# Patient Record
Sex: MALE | Race: WHITE | NOT HISPANIC OR LATINO | Employment: FULL TIME | URBAN - METROPOLITAN AREA
[De-identification: names, ages, dates, MRNs, and addresses within clinical notes are randomized per-mention and may not be internally consistent; named-entity substitution may affect disease eponyms.]

---

## 2018-06-18 ENCOUNTER — OFFICE VISIT (OUTPATIENT)
Dept: ORTHOPEDIC SURGERY | Facility: CLINIC | Age: 48
End: 2018-06-18

## 2018-06-18 ENCOUNTER — HOSPITAL ENCOUNTER (OUTPATIENT)
Dept: CARDIOLOGY | Facility: HOSPITAL | Age: 48
Discharge: HOME | End: 2018-06-18
Attending: ORTHOPAEDIC SURGERY | Admitting: ORTHOPAEDIC SURGERY

## 2018-06-18 VITALS — BODY MASS INDEX: 30.66 KG/M2 | TEMPERATURE: 97.7 F | WEIGHT: 207 LBS | HEIGHT: 69 IN

## 2018-06-18 DIAGNOSIS — M25.561 ACUTE PAIN OF RIGHT KNEE: Primary | ICD-10-CM

## 2018-06-18 DIAGNOSIS — S82.141A CLOSED FRACTURE OF RIGHT TIBIAL PLATEAU, INITIAL ENCOUNTER: ICD-10-CM

## 2018-06-18 DIAGNOSIS — M25.561 ACUTE PAIN OF RIGHT KNEE: ICD-10-CM

## 2018-06-18 LAB
BH CV LOWER VASCULAR LEFT COMMON FEMORAL AUGMENT: NORMAL
BH CV LOWER VASCULAR LEFT COMMON FEMORAL COMPETENT: NORMAL
BH CV LOWER VASCULAR LEFT COMMON FEMORAL COMPRESS: NORMAL
BH CV LOWER VASCULAR LEFT COMMON FEMORAL PHASIC: NORMAL
BH CV LOWER VASCULAR LEFT COMMON FEMORAL SPONT: NORMAL
BH CV LOWER VASCULAR RIGHT COMMON FEMORAL AUGMENT: NORMAL
BH CV LOWER VASCULAR RIGHT COMMON FEMORAL COMPETENT: NORMAL
BH CV LOWER VASCULAR RIGHT COMMON FEMORAL COMPRESS: NORMAL
BH CV LOWER VASCULAR RIGHT COMMON FEMORAL PHASIC: NORMAL
BH CV LOWER VASCULAR RIGHT COMMON FEMORAL SPONT: NORMAL
BH CV LOWER VASCULAR RIGHT DISTAL FEMORAL COMPRESS: NORMAL
BH CV LOWER VASCULAR RIGHT GASTRONEMIUS COMPRESS: NORMAL
BH CV LOWER VASCULAR RIGHT GREATER SAPH AK COMPRESS: NORMAL
BH CV LOWER VASCULAR RIGHT GREATER SAPH BK COMPRESS: NORMAL
BH CV LOWER VASCULAR RIGHT MID FEMORAL AUGMENT: NORMAL
BH CV LOWER VASCULAR RIGHT MID FEMORAL COMPETENT: NORMAL
BH CV LOWER VASCULAR RIGHT MID FEMORAL COMPRESS: NORMAL
BH CV LOWER VASCULAR RIGHT MID FEMORAL PHASIC: NORMAL
BH CV LOWER VASCULAR RIGHT MID FEMORAL SPONT: NORMAL
BH CV LOWER VASCULAR RIGHT PERONEAL COMPRESS: NORMAL
BH CV LOWER VASCULAR RIGHT POPLITEAL AUGMENT: NORMAL
BH CV LOWER VASCULAR RIGHT POPLITEAL COMPETENT: NORMAL
BH CV LOWER VASCULAR RIGHT POPLITEAL COMPRESS: NORMAL
BH CV LOWER VASCULAR RIGHT POPLITEAL PHASIC: NORMAL
BH CV LOWER VASCULAR RIGHT POPLITEAL SPONT: NORMAL
BH CV LOWER VASCULAR RIGHT POSTERIOR TIBIAL COMPRESS: NORMAL
BH CV LOWER VASCULAR RIGHT PROXIMAL FEMORAL COMPRESS: NORMAL
BH CV LOWER VASCULAR RIGHT SAPHENOFEMORAL JUNCTION AUGMENT: NORMAL
BH CV LOWER VASCULAR RIGHT SAPHENOFEMORAL JUNCTION COMPETENT: NORMAL
BH CV LOWER VASCULAR RIGHT SAPHENOFEMORAL JUNCTION COMPRESS: NORMAL
BH CV LOWER VASCULAR RIGHT SAPHENOFEMORAL JUNCTION PHASIC: NORMAL
BH CV LOWER VASCULAR RIGHT SAPHENOFEMORAL JUNCTION SPONT: NORMAL

## 2018-06-18 PROCEDURE — 73562 X-RAY EXAM OF KNEE 3: CPT | Performed by: ORTHOPAEDIC SURGERY

## 2018-06-18 PROCEDURE — 99203 OFFICE O/P NEW LOW 30 MIN: CPT | Performed by: ORTHOPAEDIC SURGERY

## 2018-06-18 PROCEDURE — 93971 EXTREMITY STUDY: CPT

## 2018-06-18 RX ORDER — OXYCODONE AND ACETAMINOPHEN 7.5; 325 MG/1; MG/1
TABLET ORAL
Qty: 50 TABLET | Refills: 0 | Status: SHIPPED | OUTPATIENT
Start: 2018-06-18 | End: 2018-06-22 | Stop reason: HOSPADM

## 2018-06-18 RX ORDER — OXYCODONE AND ACETAMINOPHEN 10; 325 MG/1; MG/1
TABLET ORAL
Refills: 0 | COMMUNITY
Start: 2018-06-11 | End: 2018-06-20

## 2018-06-19 ENCOUNTER — TELEPHONE (OUTPATIENT)
Dept: ORTHOPEDIC SURGERY | Facility: CLINIC | Age: 48
End: 2018-06-19

## 2018-06-19 NOTE — PROGRESS NOTES
New Knee      Patient: Clifford Cyr        YOB: 1970    Medical Record Number: [id number]        Chief Complaints: Right knee pain      History of Present Illness: This is a  48 y.o. [place] who injured his right knee approximately 10 days ago he was shoeing a horse and part of many was kicked on the anterior medial aspect of his right lower extremity.  He did finish shoeing a horse ultimately was seen for an x-ray placed on crutches.  He did see Dr. jerez felt however he states he never saw her he only saw her assistant and they were going to refer her somewhere but drop the ball he states his symptoms are moderate intermittent his significant swelling but that has improved he comes with a CT scan as well his past medical history is unremarkable    Allergies: No Known Allergies    Medications:   Home Medications:  No current outpatient prescriptions on file prior to visit.     No current facility-administered medications on file prior to visit.      Current Medications:  Scheduled Meds:  Continuous Infusions:  No current facility-administered medications for this visit.   PRN Meds:.    History reviewed. No pertinent past medical history.   History reviewed. No pertinent surgical history.     Social History     Occupational History   • Not on file.     Social History Main Topics   • Smoking status: Never Smoker   • Smokeless tobacco: Never Used   • Alcohol use Yes   • Drug use: No   • Sexual activity: Defer    Social History     Social History Narrative   • No narrative on file        Family History   Problem Relation Age of Onset   • No Known Problems Mother    • No Known Problems Father    • No Known Problems Sister    • Clotting disorder Brother    • No Known Problems Maternal Aunt    • No Known Problems Maternal Uncle    • No Known Problems Paternal Aunt    • No Known Problems Paternal Uncle    • No Known Problems Maternal Grandmother    • No Known Problems Maternal Grandfather    • No Known  "Problems Paternal Grandmother    • No Known Problems Paternal Grandfather    • Anesthesia problems Neg Hx    • Broken bones Neg Hx    • Cancer Neg Hx    • Collagen disease Neg Hx    • Diabetes Neg Hx    • Dislocations Neg Hx    • Osteoporosis Neg Hx    • Rheumatologic disease Neg Hx    • Scoliosis Neg Hx    • Severe sprains Neg Hx              Review of Systems: 14 point review of systems are remarkable for the knee pain only the remainder are negative    Review of Systems      Physical Exam: 48 y.o. male  General Appearance:    Alert, cooperative, in no acute distress                 Vitals:    06/18/18 1255   Temp: 97.7 °F (36.5 °C)   Weight: 93.9 kg (207 lb)   Height: 175.3 cm (69\")      Patient is alert and read ×3 no acute distress appears her above-listed at height weight and age.  Affect is normal respiratory rate is normal unlabored. Heart rate regular rate rhythm, sclera, dentition and hearing are normal for the purpose of this exam.        Ortho Exam physical exam the right knee has significant knee effusion calf swelling ankle and foot swelling is perhaps tenderness around the entire proximal tibia he has 1+ distal pulses    Procedures             Radiology:   AP, Lateral and merchant views of the right knee  were ordered/reviewed to evauateknee pain.  He has a comminuted bicondylar tibial plateau fracture he also comes with a CT scan with 3-D reconstructions which show a comminuted, intra-articular bicondylar tibial plateau fracture that is displaced  Imaging Results (most recent)     Procedure Component Value Units Date/Time    XR Knee 3 View Right [[id number]] Resulted:  06/18/18 1246     Updated:  06/18/18 1247    Impression:       Ordering physician's impression is located in the Encounter Note dated 06/18/18. X-ray performed in the DR room.          Assessment/Plan:    Tibial plateau fracture this needs to be fixed.  I had a long discussion with him regarding the severity of this injury and the " outlook for future arthritis.  I do think he needs a Doppler to rule out a DVT in view the fact he has significant swelling in his had a recent long car ride we will get a Doppler today I will refill his pain medicine and I will give him to Dr. Capellan  whom I spoke with

## 2018-06-20 ENCOUNTER — HOSPITAL ENCOUNTER (OUTPATIENT)
Dept: GENERAL RADIOLOGY | Facility: HOSPITAL | Age: 48
Discharge: HOME | End: 2018-06-20
Admitting: ORTHOPAEDIC SURGERY

## 2018-06-20 ENCOUNTER — APPOINTMENT (OUTPATIENT)
Dept: PREADMISSION TESTING | Facility: HOSPITAL | Age: 48
End: 2018-06-20

## 2018-06-20 VITALS
HEIGHT: 69 IN | HEART RATE: 67 BPM | DIASTOLIC BLOOD PRESSURE: 83 MMHG | WEIGHT: 210.56 LBS | OXYGEN SATURATION: 97 % | SYSTOLIC BLOOD PRESSURE: 149 MMHG | TEMPERATURE: 97.9 F | BODY MASS INDEX: 31.19 KG/M2 | RESPIRATION RATE: 16 BRPM

## 2018-06-20 PROBLEM — S82.141A CLOSED BICONDYLAR FRACTURE OF RIGHT TIBIAL PLATEAU: Status: ACTIVE | Noted: 2018-06-20

## 2018-06-20 LAB
ALBUMIN SERPL-MCNC: 4.4 G/DL (ref 3.5–5.2)
ALBUMIN/GLOB SERPL: 1.5 G/DL
ALP SERPL-CCNC: 53 U/L (ref 39–117)
ALT SERPL W P-5'-P-CCNC: 27 U/L (ref 1–41)
ANION GAP SERPL CALCULATED.3IONS-SCNC: 14.3 MMOL/L
AST SERPL-CCNC: 17 U/L (ref 1–40)
BASOPHILS # BLD AUTO: 0.05 10*3/MM3 (ref 0–0.2)
BASOPHILS NFR BLD AUTO: 0.6 % (ref 0–1.5)
BILIRUB SERPL-MCNC: 0.7 MG/DL (ref 0.1–1.2)
BUN BLD-MCNC: 24 MG/DL (ref 6–20)
BUN/CREAT SERPL: 23.8 (ref 7–25)
CALCIUM SPEC-SCNC: 9.2 MG/DL (ref 8.6–10.5)
CHLORIDE SERPL-SCNC: 103 MMOL/L (ref 98–107)
CO2 SERPL-SCNC: 24.7 MMOL/L (ref 22–29)
CREAT BLD-MCNC: 1.01 MG/DL (ref 0.76–1.27)
DEPRECATED RDW RBC AUTO: 41.6 FL (ref 37–54)
EOSINOPHIL # BLD AUTO: 0.43 10*3/MM3 (ref 0–0.7)
EOSINOPHIL NFR BLD AUTO: 5.3 % (ref 0.3–6.2)
ERYTHROCYTE [DISTWIDTH] IN BLOOD BY AUTOMATED COUNT: 12.6 % (ref 11.5–14.5)
GFR SERPL CREATININE-BSD FRML MDRD: 79 ML/MIN/1.73
GLOBULIN UR ELPH-MCNC: 2.9 GM/DL
GLUCOSE BLD-MCNC: 135 MG/DL (ref 65–99)
HCT VFR BLD AUTO: 43.4 % (ref 40.4–52.2)
HGB BLD-MCNC: 14.2 G/DL (ref 13.7–17.6)
IMM GRANULOCYTES # BLD: 0 10*3/MM3 (ref 0–0.03)
IMM GRANULOCYTES NFR BLD: 0 % (ref 0–0.5)
LYMPHOCYTES # BLD AUTO: 2.42 10*3/MM3 (ref 0.9–4.8)
LYMPHOCYTES NFR BLD AUTO: 30 % (ref 19.6–45.3)
MCH RBC QN AUTO: 29.6 PG (ref 27–32.7)
MCHC RBC AUTO-ENTMCNC: 32.7 G/DL (ref 32.6–36.4)
MCV RBC AUTO: 90.6 FL (ref 79.8–96.2)
MONOCYTES # BLD AUTO: 0.9 10*3/MM3 (ref 0.2–1.2)
MONOCYTES NFR BLD AUTO: 11.2 % (ref 5–12)
NEUTROPHILS # BLD AUTO: 4.26 10*3/MM3 (ref 1.9–8.1)
NEUTROPHILS NFR BLD AUTO: 52.9 % (ref 42.7–76)
PLATELET # BLD AUTO: 254 10*3/MM3 (ref 140–500)
PMV BLD AUTO: 10.9 FL (ref 6–12)
POTASSIUM BLD-SCNC: 4.4 MMOL/L (ref 3.5–5.2)
PROT SERPL-MCNC: 7.3 G/DL (ref 6–8.5)
RBC # BLD AUTO: 4.79 10*6/MM3 (ref 4.6–6)
SODIUM BLD-SCNC: 142 MMOL/L (ref 136–145)
WBC NRBC COR # BLD: 8.06 10*3/MM3 (ref 4.5–10.7)

## 2018-06-20 PROCEDURE — 93005 ELECTROCARDIOGRAM TRACING: CPT

## 2018-06-20 PROCEDURE — 85025 COMPLETE CBC W/AUTO DIFF WBC: CPT | Performed by: ORTHOPAEDIC SURGERY

## 2018-06-20 PROCEDURE — 93010 ELECTROCARDIOGRAM REPORT: CPT | Performed by: INTERNAL MEDICINE

## 2018-06-20 PROCEDURE — 80053 COMPREHEN METABOLIC PANEL: CPT | Performed by: ORTHOPAEDIC SURGERY

## 2018-06-20 PROCEDURE — 71046 X-RAY EXAM CHEST 2 VIEWS: CPT

## 2018-06-20 PROCEDURE — 36415 COLL VENOUS BLD VENIPUNCTURE: CPT

## 2018-06-20 NOTE — H&P
Provider: JESSY ARAIZA MD  HUANG JOE is a 48 year old male whose main reason for today's visit is pain in the right knee which has been present for 12 days.  The injury occurred on 06/08/2018.  He was visiting his friend in [place], [place].  Unfortunately, a horse kicked him on the knee.  He had hyperextension of the knee.  He felt sudden onset of pain.  The patient initially saw Dr. Kirstie Vickers when he returned to [place].  He was ordered to have a CT scan.  Unfortunately, he was not able to get any further answers and he has seen Dr. Cecile Granados.  Dr. Granados has contacted me with his injury films and asked me to evaluate and manage him further.  The patient is on pain meds(PERCOCET TABS (OXYCODONE-ACETAMINOPHEN TABS)).  The patient states that he is having a sharp pain which he rates at a 8 on a scale from 1-10.  The pain occurs intermittently.    He is currently nonweightbearing and is on crutches.  He had an ultrasound evaluation which was negative for blood clots.  Denies any history of MRSA, DVT.  He works as a horse farrier.  Review of Systems:  Positive for: Increased Thirst.    Patient denies: Abdominal Pain, Bleeding, Chest Pain, Convulsions/Seizure, Decreased Motion, Depression, Difficulty Swallowing, Easy Bruisability, Emotional Disturbances, Eyes or Vision Problems, Fecal Incontinence, Fever/Chills, Headaches, Increased Hunger, Insomnia, Joint Pain, Nausea/Vomiting, Night Sweats, Poor Balance, Persistent Cough, Rash, Shortness of Breath, Shortness of Breath While Lying down, Skin Problems, Urinary Retention and Weakness.  Allergies:  * no known allergies  Medications:  advair diskus 250-50 mcg/dose inhalation aerosol powder breath activated (fluticasone-salmeterol)   ibuprofen caps (ibuprofen caps)   percocet tabs (oxycodone-acetaminophen tabs)   aleve tabs (naproxen sodium tabs)   Patient History of:  BLOOD CLOTS/EMBOLISM - NEGATIVE  Surgical History:  Negative  Known Family  History of:  cancer-father  cancer-mother  Social History:  TATYANA KHALIL is a  48 year old male.  He has never used tobacco products.      Past medical, social, family histories and ROS reviewed today with the patient and changes documented in the chart (06/20/2018).  PCP MARILEE George DO    Physical Exam  Height:  66 in.    Weight:  211.2 lbs.     BMI:  34.21  Pulse:  67  Blood pressure:  150 / 100 mm Hg    Gait: antalgic               Ambulation: with crutches      Mental/HEENT/Cardio/Skin  The patient's general appearance is well developed, well nourished, no acute distress.  Orientation is alert and oriented x 3.  The patient's mood is normal.  A head exam reveals normocephalic/atraumatic.  An eye exam reveals pupils equal.  Pulmonary exam shows normal air exchange, no labored breathing, or shortness of breath.  A skin exam shows normal temperature and color in the area of examination.      Right Knee  limited examination secondary to his recent injury. Skin is normal.  Effusion is 2+.  No warmth.  Patient shows no signs of DVT.  Normal sensation.  The DP Pulse is 2+.  Capillary refill is normal.  Range of motion of the knee is 10 to 45 degrees of flexion.  There is severe tenderness in the medial tibial plateau.      Left Knee        Imaging/Diagnostic Studies    X-rays of the right knee were taken on 06/18/2018 were reviewed.  X-rays show fracture of the tibial plateau .  bicondylar fracture  Impression  Right tibial plateau displaced fracture ([id number])    Plan  The patient has  a displaced bicondylar tibial plateau fracture .    Options were discussed including nonoperative management versus surgical intervention.    The patient is indicated for open reduction and internal fixation of right tibial plateau fracture, medial and lateral.    The likely risks and benefits of the procedure including but not limited to infection, DVT, pulmonary embolism, malunion, nonunion, post traumatic  stiffness, post traumatic arthritis, possibility of injury to nerves, vessels, tendons have been discussed in detail with the patient and his wife.  Despite the risks involved the patient would like to proceed.    The surgery is scheduled for open reduction internal fixation of the right tibial plateau fracture   at [facility] on June 21, 2018.  He may need a bone void filler/allograft.  He understands that he has to remain nonweightbearing on the right lower extremity for up to 3 months.  And there is a significant amount of rehabilitation involved.  Patient does not use tobacco.      We discussed the benefits of surgical intervention, as well as alternative treatments.  Potential surgical risks and complications include but are not limited to expected outcomes and the risk that the procedure may not accomplish the desired objective.  Sufficient opportunity was given to discuss the condition and treatment plan with the doctor, and all questions were answered for the patient.  The discussion lasted 30 minutes.      Clifford should follow up with JESSY ARAIZA MD post op.

## 2018-06-20 NOTE — DISCHARGE INSTRUCTIONS
Take the following medications the morning of surgery with a small sip of water:    HYDROCODONE IF NEEDED    General Instructions:  • Do not eat solid food after midnight the night before surgery.  • You may drink clear liquids day of surgery but must stop at least one hour before your hospital arrival time @ 0900 AM STOP DRINKING!!!  • It is beneficial for you to have a clear drink that contains carbohydrates the day of surgery.  We suggest a 12 to 20 ounce bottle of Gatorade or Powerade for non-diabetic patients or a 12 to 20 ounce bottle of G2 or Powerade Zero for diabetic patients. (Pediatric patients, are not advised to drink a 12 to 20 ounce carbohydrate drink)    Clear liquids are liquids you can see through.  Nothing red in color.     Plain water                               Sports drinks  Sodas                                   Gelatin (Jell-O)  Fruit juices without pulp such as white grape juice and apple juice  Popsicles that contain no fruit or yogurt  Tea or coffee (no cream or milk added)  Gatorade / Powerade  G2 / Powerade Zero  • Patients who avoid smoking, chewing tobacco and alcohol for 4 weeks prior to surgery have a reduced risk of post-operative complications.  Quit smoking as many days before surgery as you can.  • Do not smoke, use chewing tobacco or drink alcohol the day of surgery.   • If applicable bring your C-PAP/ BI-PAP machine.  • Bring any papers given to you in the doctor’s office.  • Wear clean comfortable clothes and socks.  • Do not wear contact lenses or make-up.  Bring a case for your glasses.   • Bring crutches or walker if applicable.  • Remove all piercings.  Leave jewelry and any other valuables at home.  • Hair extensions with metal clips must be removed prior to surgery.  • The Pre-Admission Testing nurse will instruct you to bring medications if unable to obtain an accurate list in Pre-Admission Testing.          Preventing a Surgical Site Infection:  • For 2 to 3 days  before surgery, avoid shaving with a razor because the razor can irritate skin and make it easier to develop an infection.    • Any areas of open skin can increase the risk of a post-operative wound infection by allowing bacteria to enter and travel throughout the body.  Notify your surgeon if you have any skin wounds / rashes even if it is not near the expected surgical site.  The area will need assessed to determine if surgery should be delayed until it is healed.  • The night prior to surgery sleep in a clean bed with clean clothing.  Do not allow pets to sleep with you.  • Shower on the morning of surgery using a fresh bar of anti-bacterial soap (such as Dial) and clean washcloth.  Dry with a clean towel and dress in clean clothing.  • Ask your surgeon if you will be receiving antibiotics prior to surgery.  • Make sure you, your family, and all healthcare providers clean their hands with soap and water or an alcohol based hand  before caring for you or your wound.    Day of surgery:  06- ARRIVE @ 1000 AM REPORT TO THE [facility]  Upon arrival, a Pre-op nurse and Anesthesiologist will review your health history, obtain vital signs, and answer questions you may have.  The only belongings needed at this time will be your home medications and if applicable your C-PAP/BI-PAP machine.  If you are staying overnight your family can leave the rest of your belongings in the car and bring them to your room later.  A Pre-op nurse will start an IV and you may receive medication in preparation for surgery, including something to help you relax.  Your family will be able to see you in the Pre-op area.  While you are in surgery your family should notify the waiting room  if they leave the waiting room area and provide a contact phone number.    Please be aware that surgery does come with discomfort.  We want to make every effort to control your discomfort so please discuss any uncontrolled symptoms with  your nurse.   Your doctor will most likely have prescribed pain medications.      If you are going home after surgery you will receive individualized written care instructions before being discharged.  A responsible adult must drive you to and from the hospital on the day of your surgery and stay with you for 24 hours.    If you are staying overnight following surgery, you will be transported to your hospital room following the recovery period.  [facility] has all private rooms.    If you have any questions please call Pre-Admission Testing at [phone].  Deductibles and co-payments are collected on the day of service. Please be prepared to pay the required co-pay, deductible or deposit on the day of service as defined by your plan.

## 2018-06-21 ENCOUNTER — APPOINTMENT (OUTPATIENT)
Dept: GENERAL RADIOLOGY | Facility: HOSPITAL | Age: 48
End: 2018-06-21

## 2018-06-21 ENCOUNTER — ANESTHESIA EVENT (OUTPATIENT)
Dept: PERIOP | Facility: HOSPITAL | Age: 48
End: 2018-06-21

## 2018-06-21 ENCOUNTER — HOSPITAL ENCOUNTER (INPATIENT)
Facility: HOSPITAL | Age: 48
LOS: 1 days | Discharge: HOME | End: 2018-06-22
Attending: ORTHOPAEDIC SURGERY | Admitting: ORTHOPAEDIC SURGERY

## 2018-06-21 ENCOUNTER — ANESTHESIA (OUTPATIENT)
Dept: PERIOP | Facility: HOSPITAL | Age: 48
End: 2018-06-21

## 2018-06-21 DIAGNOSIS — S82.141A CLOSED BICONDYLAR FRACTURE OF RIGHT TIBIAL PLATEAU: Primary | ICD-10-CM

## 2018-06-21 PROCEDURE — 25010000002 MIDAZOLAM PER 1 MG: Performed by: ANESTHESIOLOGY

## 2018-06-21 PROCEDURE — 25010000003 CEFAZOLIN IN DEXTROSE 2-4 GM/100ML-% SOLUTION: Performed by: ORTHOPAEDIC SURGERY

## 2018-06-21 PROCEDURE — C1713 ANCHOR/SCREW BN/BN,TIS/BN: HCPCS | Performed by: ORTHOPAEDIC SURGERY

## 2018-06-21 PROCEDURE — 25010000002 PROPOFOL 10 MG/ML EMULSION: Performed by: NURSE ANESTHETIST, CERTIFIED REGISTERED

## 2018-06-21 PROCEDURE — G0378 HOSPITAL OBSERVATION PER HR: HCPCS

## 2018-06-21 PROCEDURE — 25010000002 KETOROLAC TROMETHAMINE PER 15 MG: Performed by: ORTHOPAEDIC SURGERY

## 2018-06-21 PROCEDURE — 25010000002 SUCCINYLCHOLINE PER 20 MG: Performed by: NURSE ANESTHETIST, CERTIFIED REGISTERED

## 2018-06-21 PROCEDURE — 25010000002 FENTANYL CITRATE (PF) 100 MCG/2ML SOLUTION: Performed by: NURSE ANESTHETIST, CERTIFIED REGISTERED

## 2018-06-21 PROCEDURE — 25010000002 ROPIVACAINE PER 1 MG: Performed by: ANESTHESIOLOGY

## 2018-06-21 PROCEDURE — 25010000002 DEXAMETHASONE PER 1 MG: Performed by: NURSE ANESTHETIST, CERTIFIED REGISTERED

## 2018-06-21 PROCEDURE — L1830 KO IMMOB CANVAS LONG PRE OTS: HCPCS | Performed by: ORTHOPAEDIC SURGERY

## 2018-06-21 PROCEDURE — 25010000002 KETOROLAC TROMETHAMINE PER 15 MG: Performed by: NURSE ANESTHETIST, CERTIFIED REGISTERED

## 2018-06-21 PROCEDURE — 73560 X-RAY EXAM OF KNEE 1 OR 2: CPT

## 2018-06-21 PROCEDURE — 73590 X-RAY EXAM OF LOWER LEG: CPT

## 2018-06-21 PROCEDURE — 76000 FLUOROSCOPY <1 HR PHYS/QHP: CPT

## 2018-06-21 PROCEDURE — 0QSG04Z REPOSITION RIGHT TIBIA WITH INTERNAL FIXATION DEVICE, OPEN APPROACH: ICD-10-PCS | Performed by: ORTHOPAEDIC SURGERY

## 2018-06-21 PROCEDURE — 25010000002 FENTANYL CITRATE (PF) 100 MCG/2ML SOLUTION: Performed by: ANESTHESIOLOGY

## 2018-06-21 PROCEDURE — 25010000002 ONDANSETRON PER 1 MG: Performed by: NURSE ANESTHETIST, CERTIFIED REGISTERED

## 2018-06-21 DEVICE — SCRW LK S/TAP STRDRV 3.5X48MM: Type: IMPLANTABLE DEVICE | Site: TIBIA | Status: FUNCTIONAL

## 2018-06-21 DEVICE — IMPLANTABLE DEVICE: Type: IMPLANTABLE DEVICE | Site: TIBIA | Status: FUNCTIONAL

## 2018-06-21 DEVICE — GII QUICKANCHOR PLUS SIZE 2 (5 METRIC) PANACRYL BRAIDED ABSORBABLE SUTURE 36 INCHES (91CM), DOUBLE ARMED WITH CP-2 NEEDLES, WITH DISPOSABLE INSERTER.
Type: IMPLANTABLE DEVICE | Site: TIBIA | Status: FUNCTIONAL
Brand: GII QUICKANCHOR PANACRYL

## 2018-06-21 DEVICE — SCRW CORT S/TAP 3.5X55MM: Type: IMPLANTABLE DEVICE | Site: TIBIA | Status: FUNCTIONAL

## 2018-06-21 DEVICE — SCRW LK S/TAP STRDRV 3.5X60MM: Type: IMPLANTABLE DEVICE | Site: TIBIA | Status: FUNCTIONAL

## 2018-06-21 RX ORDER — ONDANSETRON 2 MG/ML
4 INJECTION INTRAMUSCULAR; INTRAVENOUS EVERY 6 HOURS PRN
Status: DISCONTINUED | OUTPATIENT
Start: 2018-06-21 | End: 2018-06-22 | Stop reason: HOSPADM

## 2018-06-21 RX ORDER — FENTANYL CITRATE 50 UG/ML
50 INJECTION, SOLUTION INTRAMUSCULAR; INTRAVENOUS
Status: DISCONTINUED | OUTPATIENT
Start: 2018-06-21 | End: 2018-06-21 | Stop reason: HOSPADM

## 2018-06-21 RX ORDER — LABETALOL HYDROCHLORIDE 5 MG/ML
5 INJECTION, SOLUTION INTRAVENOUS
Status: DISCONTINUED | OUTPATIENT
Start: 2018-06-21 | End: 2018-06-21 | Stop reason: HOSPADM

## 2018-06-21 RX ORDER — LIDOCAINE HYDROCHLORIDE 20 MG/ML
INJECTION, SOLUTION INFILTRATION; PERINEURAL AS NEEDED
Status: DISCONTINUED | OUTPATIENT
Start: 2018-06-21 | End: 2018-06-21 | Stop reason: SURG

## 2018-06-21 RX ORDER — PROMETHAZINE HYDROCHLORIDE 25 MG/1
25 SUPPOSITORY RECTAL ONCE AS NEEDED
Status: DISCONTINUED | OUTPATIENT
Start: 2018-06-21 | End: 2018-06-21 | Stop reason: HOSPADM

## 2018-06-21 RX ORDER — SUCCINYLCHOLINE CHLORIDE 20 MG/ML
INJECTION INTRAMUSCULAR; INTRAVENOUS AS NEEDED
Status: DISCONTINUED | OUTPATIENT
Start: 2018-06-21 | End: 2018-06-21 | Stop reason: SURG

## 2018-06-21 RX ORDER — MIDAZOLAM HYDROCHLORIDE 1 MG/ML
1 INJECTION INTRAMUSCULAR; INTRAVENOUS
Status: DISCONTINUED | OUTPATIENT
Start: 2018-06-21 | End: 2018-06-21 | Stop reason: HOSPADM

## 2018-06-21 RX ORDER — KETOROLAC TROMETHAMINE 30 MG/ML
30 INJECTION, SOLUTION INTRAMUSCULAR; INTRAVENOUS EVERY 8 HOURS
Status: COMPLETED | OUTPATIENT
Start: 2018-06-21 | End: 2018-06-22

## 2018-06-21 RX ORDER — MEPERIDINE HYDROCHLORIDE 25 MG/ML
12.5 INJECTION INTRAMUSCULAR; INTRAVENOUS; SUBCUTANEOUS
Status: DISCONTINUED | OUTPATIENT
Start: 2018-06-21 | End: 2018-06-21 | Stop reason: HOSPADM

## 2018-06-21 RX ORDER — DIPHENHYDRAMINE HYDROCHLORIDE 50 MG/ML
12.5 INJECTION INTRAMUSCULAR; INTRAVENOUS
Status: DISCONTINUED | OUTPATIENT
Start: 2018-06-21 | End: 2018-06-21 | Stop reason: HOSPADM

## 2018-06-21 RX ORDER — NALOXONE HCL 0.4 MG/ML
0.1 VIAL (ML) INJECTION
Status: DISCONTINUED | OUTPATIENT
Start: 2018-06-21 | End: 2018-06-22 | Stop reason: HOSPADM

## 2018-06-21 RX ORDER — DEXAMETHASONE SODIUM PHOSPHATE 10 MG/ML
INJECTION INTRAMUSCULAR; INTRAVENOUS AS NEEDED
Status: DISCONTINUED | OUTPATIENT
Start: 2018-06-21 | End: 2018-06-21 | Stop reason: SURG

## 2018-06-21 RX ORDER — NALOXONE HCL 0.4 MG/ML
0.2 VIAL (ML) INJECTION AS NEEDED
Status: DISCONTINUED | OUTPATIENT
Start: 2018-06-21 | End: 2018-06-21 | Stop reason: HOSPADM

## 2018-06-21 RX ORDER — PROMETHAZINE HYDROCHLORIDE 25 MG/1
25 TABLET ORAL ONCE AS NEEDED
Status: DISCONTINUED | OUTPATIENT
Start: 2018-06-21 | End: 2018-06-21 | Stop reason: HOSPADM

## 2018-06-21 RX ORDER — ALBUTEROL SULFATE 2.5 MG/3ML
2.5 SOLUTION RESPIRATORY (INHALATION) ONCE AS NEEDED
Status: DISCONTINUED | OUTPATIENT
Start: 2018-06-21 | End: 2018-06-21 | Stop reason: HOSPADM

## 2018-06-21 RX ORDER — SODIUM CHLORIDE, SODIUM LACTATE, POTASSIUM CHLORIDE, CALCIUM CHLORIDE 600; 310; 30; 20 MG/100ML; MG/100ML; MG/100ML; MG/100ML
100 INJECTION, SOLUTION INTRAVENOUS CONTINUOUS
Status: DISCONTINUED | OUTPATIENT
Start: 2018-06-21 | End: 2018-06-22 | Stop reason: HOSPADM

## 2018-06-21 RX ORDER — MAGNESIUM HYDROXIDE 1200 MG/15ML
LIQUID ORAL AS NEEDED
Status: DISCONTINUED | OUTPATIENT
Start: 2018-06-21 | End: 2018-06-21 | Stop reason: HOSPADM

## 2018-06-21 RX ORDER — IBUPROFEN 200 MG
400 TABLET ORAL EVERY 6 HOURS PRN
COMMUNITY

## 2018-06-21 RX ORDER — FENTANYL CITRATE 50 UG/ML
INJECTION, SOLUTION INTRAMUSCULAR; INTRAVENOUS AS NEEDED
Status: DISCONTINUED | OUTPATIENT
Start: 2018-06-21 | End: 2018-06-21 | Stop reason: SURG

## 2018-06-21 RX ORDER — HYDROCODONE BITARTRATE AND ACETAMINOPHEN 10; 325 MG/1; MG/1
1 TABLET ORAL EVERY 4 HOURS PRN
Status: DISCONTINUED | OUTPATIENT
Start: 2018-06-21 | End: 2018-06-22 | Stop reason: HOSPADM

## 2018-06-21 RX ORDER — HYDROMORPHONE HYDROCHLORIDE 1 MG/ML
0.5 INJECTION, SOLUTION INTRAMUSCULAR; INTRAVENOUS; SUBCUTANEOUS
Status: DISCONTINUED | OUTPATIENT
Start: 2018-06-21 | End: 2018-06-21 | Stop reason: HOSPADM

## 2018-06-21 RX ORDER — PROMETHAZINE HYDROCHLORIDE 25 MG/ML
5 INJECTION, SOLUTION INTRAMUSCULAR; INTRAVENOUS
Status: DISCONTINUED | OUTPATIENT
Start: 2018-06-21 | End: 2018-06-21 | Stop reason: HOSPADM

## 2018-06-21 RX ORDER — PROMETHAZINE HYDROCHLORIDE 25 MG/ML
12.5 INJECTION, SOLUTION INTRAMUSCULAR; INTRAVENOUS ONCE AS NEEDED
Status: DISCONTINUED | OUTPATIENT
Start: 2018-06-21 | End: 2018-06-21 | Stop reason: HOSPADM

## 2018-06-21 RX ORDER — ONDANSETRON 2 MG/ML
INJECTION INTRAMUSCULAR; INTRAVENOUS AS NEEDED
Status: DISCONTINUED | OUTPATIENT
Start: 2018-06-21 | End: 2018-06-21 | Stop reason: SURG

## 2018-06-21 RX ORDER — KETOROLAC TROMETHAMINE 30 MG/ML
INJECTION, SOLUTION INTRAMUSCULAR; INTRAVENOUS AS NEEDED
Status: DISCONTINUED | OUTPATIENT
Start: 2018-06-21 | End: 2018-06-21 | Stop reason: SURG

## 2018-06-21 RX ORDER — OXYCODONE AND ACETAMINOPHEN 7.5; 325 MG/1; MG/1
2 TABLET ORAL EVERY 4 HOURS PRN
Status: DISCONTINUED | OUTPATIENT
Start: 2018-06-21 | End: 2018-06-22 | Stop reason: HOSPADM

## 2018-06-21 RX ORDER — ROPIVACAINE HYDROCHLORIDE 5 MG/ML
INJECTION, SOLUTION EPIDURAL; INFILTRATION; PERINEURAL AS NEEDED
Status: DISCONTINUED | OUTPATIENT
Start: 2018-06-21 | End: 2018-06-21 | Stop reason: SURG

## 2018-06-21 RX ORDER — OXYCODONE AND ACETAMINOPHEN 7.5; 325 MG/1; MG/1
1 TABLET ORAL ONCE AS NEEDED
Status: DISCONTINUED | OUTPATIENT
Start: 2018-06-21 | End: 2018-06-21 | Stop reason: HOSPADM

## 2018-06-21 RX ORDER — BISACODYL 5 MG/1
10 TABLET, DELAYED RELEASE ORAL DAILY PRN
Status: DISCONTINUED | OUTPATIENT
Start: 2018-06-21 | End: 2018-06-22 | Stop reason: HOSPADM

## 2018-06-21 RX ORDER — HYDROCODONE BITARTRATE AND ACETAMINOPHEN 7.5; 325 MG/1; MG/1
1 TABLET ORAL ONCE AS NEEDED
Status: DISCONTINUED | OUTPATIENT
Start: 2018-06-21 | End: 2018-06-21 | Stop reason: HOSPADM

## 2018-06-21 RX ORDER — FLUMAZENIL 0.1 MG/ML
0.2 INJECTION INTRAVENOUS AS NEEDED
Status: DISCONTINUED | OUTPATIENT
Start: 2018-06-21 | End: 2018-06-21 | Stop reason: HOSPADM

## 2018-06-21 RX ORDER — ONDANSETRON 4 MG/1
4 TABLET, ORALLY DISINTEGRATING ORAL EVERY 6 HOURS PRN
Status: DISCONTINUED | OUTPATIENT
Start: 2018-06-21 | End: 2018-06-22 | Stop reason: HOSPADM

## 2018-06-21 RX ORDER — ONDANSETRON 2 MG/ML
4 INJECTION INTRAMUSCULAR; INTRAVENOUS ONCE AS NEEDED
Status: DISCONTINUED | OUTPATIENT
Start: 2018-06-21 | End: 2018-06-21 | Stop reason: HOSPADM

## 2018-06-21 RX ORDER — FAMOTIDINE 10 MG/ML
20 INJECTION, SOLUTION INTRAVENOUS ONCE
Status: COMPLETED | OUTPATIENT
Start: 2018-06-21 | End: 2018-06-21

## 2018-06-21 RX ORDER — DOCUSATE SODIUM 100 MG/1
100 CAPSULE, LIQUID FILLED ORAL 2 TIMES DAILY PRN
Status: DISCONTINUED | OUTPATIENT
Start: 2018-06-21 | End: 2018-06-22 | Stop reason: HOSPADM

## 2018-06-21 RX ORDER — HYDRALAZINE HYDROCHLORIDE 20 MG/ML
5 INJECTION INTRAMUSCULAR; INTRAVENOUS
Status: DISCONTINUED | OUTPATIENT
Start: 2018-06-21 | End: 2018-06-21 | Stop reason: HOSPADM

## 2018-06-21 RX ORDER — PROPOFOL 10 MG/ML
VIAL (ML) INTRAVENOUS AS NEEDED
Status: DISCONTINUED | OUTPATIENT
Start: 2018-06-21 | End: 2018-06-21 | Stop reason: SURG

## 2018-06-21 RX ORDER — SODIUM CHLORIDE 0.9 % (FLUSH) 0.9 %
1-10 SYRINGE (ML) INJECTION AS NEEDED
Status: DISCONTINUED | OUTPATIENT
Start: 2018-06-21 | End: 2018-06-21 | Stop reason: HOSPADM

## 2018-06-21 RX ORDER — MIDAZOLAM HYDROCHLORIDE 1 MG/ML
2 INJECTION INTRAMUSCULAR; INTRAVENOUS
Status: DISCONTINUED | OUTPATIENT
Start: 2018-06-21 | End: 2018-06-21 | Stop reason: HOSPADM

## 2018-06-21 RX ORDER — CEFAZOLIN SODIUM 2 G/100ML
2 INJECTION, SOLUTION INTRAVENOUS ONCE
Status: COMPLETED | OUTPATIENT
Start: 2018-06-21 | End: 2018-06-21

## 2018-06-21 RX ORDER — CEFAZOLIN SODIUM 2 G/100ML
2 INJECTION, SOLUTION INTRAVENOUS EVERY 8 HOURS
Status: COMPLETED | OUTPATIENT
Start: 2018-06-21 | End: 2018-06-22

## 2018-06-21 RX ORDER — PROMETHAZINE HYDROCHLORIDE 25 MG/1
12.5 TABLET ORAL ONCE AS NEEDED
Status: DISCONTINUED | OUTPATIENT
Start: 2018-06-21 | End: 2018-06-21 | Stop reason: HOSPADM

## 2018-06-21 RX ORDER — ONDANSETRON 4 MG/1
4 TABLET, FILM COATED ORAL EVERY 6 HOURS PRN
Status: DISCONTINUED | OUTPATIENT
Start: 2018-06-21 | End: 2018-06-22 | Stop reason: HOSPADM

## 2018-06-21 RX ORDER — LIDOCAINE HYDROCHLORIDE 10 MG/ML
0.5 INJECTION, SOLUTION EPIDURAL; INFILTRATION; INTRACAUDAL; PERINEURAL ONCE AS NEEDED
Status: DISCONTINUED | OUTPATIENT
Start: 2018-06-21 | End: 2018-06-21 | Stop reason: HOSPADM

## 2018-06-21 RX ORDER — SODIUM CHLORIDE, SODIUM LACTATE, POTASSIUM CHLORIDE, CALCIUM CHLORIDE 600; 310; 30; 20 MG/100ML; MG/100ML; MG/100ML; MG/100ML
9 INJECTION, SOLUTION INTRAVENOUS CONTINUOUS
Status: DISCONTINUED | OUTPATIENT
Start: 2018-06-21 | End: 2018-06-21 | Stop reason: HOSPADM

## 2018-06-21 RX ORDER — ROCURONIUM BROMIDE 10 MG/ML
INJECTION, SOLUTION INTRAVENOUS AS NEEDED
Status: DISCONTINUED | OUTPATIENT
Start: 2018-06-21 | End: 2018-06-21 | Stop reason: SURG

## 2018-06-21 RX ORDER — GLYCOPYRROLATE 0.2 MG/ML
INJECTION INTRAMUSCULAR; INTRAVENOUS AS NEEDED
Status: DISCONTINUED | OUTPATIENT
Start: 2018-06-21 | End: 2018-06-21 | Stop reason: SURG

## 2018-06-21 RX ORDER — HYDROMORPHONE HYDROCHLORIDE 1 MG/ML
0.5 INJECTION, SOLUTION INTRAMUSCULAR; INTRAVENOUS; SUBCUTANEOUS
Status: DISCONTINUED | OUTPATIENT
Start: 2018-06-21 | End: 2018-06-22 | Stop reason: HOSPADM

## 2018-06-21 RX ORDER — EPHEDRINE SULFATE 50 MG/ML
5 INJECTION, SOLUTION INTRAVENOUS ONCE AS NEEDED
Status: DISCONTINUED | OUTPATIENT
Start: 2018-06-21 | End: 2018-06-21 | Stop reason: HOSPADM

## 2018-06-21 RX ADMIN — SUCCINYLCHOLINE CHLORIDE 140 MG: 20 INJECTION, SOLUTION INTRAMUSCULAR; INTRAVENOUS; PARENTERAL at 14:17

## 2018-06-21 RX ADMIN — OXYCODONE HYDROCHLORIDE AND ACETAMINOPHEN 1 TABLET: 7.5; 325 TABLET ORAL at 23:45

## 2018-06-21 RX ADMIN — DEXAMETHASONE SODIUM PHOSPHATE 6 MG: 10 INJECTION INTRAMUSCULAR; INTRAVENOUS at 14:25

## 2018-06-21 RX ADMIN — MIDAZOLAM 1 MG: 1 INJECTION INTRAMUSCULAR; INTRAVENOUS at 11:18

## 2018-06-21 RX ADMIN — PROPOFOL 200 MG: 10 INJECTION, EMULSION INTRAVENOUS at 14:17

## 2018-06-21 RX ADMIN — FENTANYL CITRATE 50 MCG: 50 INJECTION, SOLUTION INTRAMUSCULAR; INTRAVENOUS at 15:59

## 2018-06-21 RX ADMIN — SODIUM CHLORIDE, POTASSIUM CHLORIDE, SODIUM LACTATE AND CALCIUM CHLORIDE: 600; 310; 30; 20 INJECTION, SOLUTION INTRAVENOUS at 16:15

## 2018-06-21 RX ADMIN — FENTANYL CITRATE 50 MCG: 50 INJECTION, SOLUTION INTRAMUSCULAR; INTRAVENOUS at 14:14

## 2018-06-21 RX ADMIN — SUGAMMADEX 189 MG: 100 INJECTION, SOLUTION INTRAVENOUS at 15:50

## 2018-06-21 RX ADMIN — KETOROLAC TROMETHAMINE 30 MG: 30 INJECTION, SOLUTION INTRAMUSCULAR; INTRAVENOUS at 17:17

## 2018-06-21 RX ADMIN — FAMOTIDINE 20 MG: 10 INJECTION, SOLUTION INTRAVENOUS at 11:18

## 2018-06-21 RX ADMIN — LIDOCAINE HYDROCHLORIDE 50 MG: 20 INJECTION, SOLUTION INFILTRATION; PERINEURAL at 14:17

## 2018-06-21 RX ADMIN — KETOROLAC TROMETHAMINE 30 MG: 30 INJECTION, SOLUTION INTRAMUSCULAR; INTRAVENOUS at 15:50

## 2018-06-21 RX ADMIN — CEFAZOLIN SODIUM 2 G: 2 INJECTION, SOLUTION INTRAVENOUS at 21:22

## 2018-06-21 RX ADMIN — FENTANYL CITRATE 50 MCG: 50 INJECTION, SOLUTION INTRAMUSCULAR; INTRAVENOUS at 15:50

## 2018-06-21 RX ADMIN — ONDANSETRON 4 MG: 2 INJECTION INTRAMUSCULAR; INTRAVENOUS at 15:50

## 2018-06-21 RX ADMIN — GLYCOPYRROLATE 0.2 MG: 0.2 INJECTION INTRAMUSCULAR; INTRAVENOUS at 14:25

## 2018-06-21 RX ADMIN — CEFAZOLIN SODIUM 2 G: 2 INJECTION, SOLUTION INTRAVENOUS at 14:14

## 2018-06-21 RX ADMIN — FENTANYL CITRATE 50 MCG: 50 INJECTION, SOLUTION INTRAMUSCULAR; INTRAVENOUS at 14:25

## 2018-06-21 RX ADMIN — FENTANYL CITRATE 50 MCG: 50 INJECTION, SOLUTION INTRAMUSCULAR; INTRAVENOUS at 13:15

## 2018-06-21 RX ADMIN — ROCURONIUM BROMIDE 5 MG: 10 INJECTION INTRAVENOUS at 14:17

## 2018-06-21 RX ADMIN — ROPIVACAINE HYDROCHLORIDE 30 ML: 5 INJECTION, SOLUTION EPIDURAL; INFILTRATION; PERINEURAL at 13:32

## 2018-06-21 RX ADMIN — SODIUM CHLORIDE, POTASSIUM CHLORIDE, SODIUM LACTATE AND CALCIUM CHLORIDE 9 ML/HR: 600; 310; 30; 20 INJECTION, SOLUTION INTRAVENOUS at 11:18

## 2018-06-21 RX ADMIN — FENTANYL CITRATE 50 MCG: 50 INJECTION, SOLUTION INTRAMUSCULAR; INTRAVENOUS at 14:17

## 2018-06-21 RX ADMIN — ROCURONIUM BROMIDE 20 MG: 10 INJECTION INTRAVENOUS at 14:59

## 2018-06-21 RX ADMIN — ROPIVACAINE HYDROCHLORIDE 30 ML: 5 INJECTION, SOLUTION EPIDURAL; INFILTRATION; PERINEURAL at 13:23

## 2018-06-21 RX ADMIN — MIDAZOLAM 2 MG: 1 INJECTION INTRAMUSCULAR; INTRAVENOUS at 13:15

## 2018-06-21 NOTE — ANESTHESIA POSTPROCEDURE EVALUATION
Patient: Clifford BUSCH Human    Procedure Summary     Date:  06/21/18 Room / Location:  [facility] OR [address] [place] [address] MAIN OR    Anesthesia Start:  1414 Anesthesia Stop:  1642    Procedure:  Open reduction internal fixation right tibial plateau fracture (Right Leg Lower) Diagnosis:       Closed bicondylar fracture of right tibial plateau      (Closed bicondylar fracture of right tibial plateau)    Surgeon:  Carlos Smith MD Provider:  Chhaya Cali MD    Anesthesia Type:  general ASA Status:  1          Anesthesia Type: general  Last vitals  BP   140/89 (06/21/18 1730)   Temp   36.4 °C (97.6 °F) (06/21/18 1636)   Pulse   83 (06/21/18 1730)   Resp   20 (06/21/18 1730)     SpO2   95 % (06/21/18 1730)     Post Anesthesia Care and Evaluation    Patient location during evaluation: PACU  Patient participation: complete - patient participated  Level of consciousness: awake  Pain management: adequate  Airway patency: patent  Anesthetic complications: No anesthetic complications    Cardiovascular status: acceptable  Respiratory status: acceptable  Hydration status: acceptable

## 2018-06-21 NOTE — ANESTHESIA PROCEDURE NOTES
Airway  Urgency: elective    Airway not difficult    General Information and Staff    Patient location during procedure: OR  Anesthesiologist: CONRAD RAMÍREZ  CRNA: ROSARIO GODDARD    Indications and Patient Condition  Indications for airway management: airway protection    Preoxygenated: yes  MILS maintained throughout  Mask difficulty assessment: 1 - vent by mask    Final Airway Details  Final airway type: endotracheal airway      Successful airway: ETT  Cuffed: yes   Successful intubation technique: direct laryngoscopy  Endotracheal tube insertion site: oral  Blade: Jey  Blade size: #4  ETT size: 7.5 mm  Cormack-Lehane Classification: grade I - full view of glottis  Placement verified by: chest auscultation and capnometry   Cuff volume (mL): 6  Measured from: lips  ETT to lips (cm): 21  Number of attempts at approach: 1    Additional Comments  Atraumatic ET Tube placement.  Teeth as pre-op. BLEBS.  -ABD sounds.  +ET CO2.  Secured to face

## 2018-06-21 NOTE — BRIEF OP NOTE
TIBIAL PLATEAU OPEN REDUCTION INTERNAL FIXATION  Progress Note    Clfiford BUSCH Human  6/21/2018    Pre-op Diagnosis:   Closed bicondylar fracture of right tibial plateau       Post-Op Diagnosis Codes:     * Closed bicondylar fracture of right tibial plateau [S82.141A]    Procedure/CPT® Codes:      Procedure(s):  Open reduction internal fixation right tibial plateau fracture    Surgeon(s):  Carlos Smith MD    Anesthesia: General    Staff:   Circulator: Danielle Dumas RN  Radiology Technologist: Rose Harris RRT  Scrub Person: Ketan Elder  Vendor Representative: Devin Ha  Assistant: Felipa Ford    Estimated Blood Loss: minimal    Urine Voided: * No values recorded between 6/21/2018  2:12 PM and 6/21/2018  4:21 PM *    Specimens:                None      Drains:      Findings: see dict     Complications: rebekah Smith MD     Date: 6/21/2018  Time: 4:21 PM

## 2018-06-21 NOTE — ANESTHESIA PROCEDURE NOTES
Peripheral Block    Patient location during procedure: holding area  Reason for block: at surgeon's request and post-op pain management  Performed by  Anesthesiologist: JESSICA ESCOBAR  Preanesthetic Checklist  Completed: patient identified, site marked, surgical consent, pre-op evaluation, timeout performed, IV checked, risks and benefits discussed and monitors and equipment checked  Prep:  Sterile barriers:cap, gloves, mask and sterile barriers  Prep: ChloraPrep  Patient monitoring: blood pressure monitoring, continuous pulse oximetry and EKG  Procedure  Sedation:yes  Performed under: local infiltration  Guidance:ultrasound guided  ULTRASOUND INTERPRETATION. Using ultrasound guidance a 22 G and 21 G gauge needle was placed in close proximity to the nerve, at which point, under ultrasound guidance anesthetic was injected in the area of the nerve and spread of the anesthesia was seen on ultrasound in close proximity thereto.  There were no abnormalities seen on ultrasound; a digital image was taken; and the patient tolerated the procedure with no complications. Images:still images obtained    Laterality:right  Block Type:femoral  Injection Technique:single-shot  Needle Type:echogenic  Needle Gauge:21 G  Resistance on Injection: none  Medications  Local Injected:ropivacaine 0.5% Local Amount Injected:30mL  Post Assessment  Injection Assessment: negative aspiration for heme, no paresthesia on injection and incremental injection  Patient Tolerance:comfortable throughout block  Complications:no  Additional Notes  Ultrasound interpretation note:  Under ultrasound guidance, needle seen near nerves, local seen spreading around.  No abnormalities noted.  Block for postop pain per surgeon request.

## 2018-06-21 NOTE — ANESTHESIA PROCEDURE NOTES
Peripheral Block    Patient location during procedure: holding area  Reason for block: at surgeon's request and post-op pain management  Performed by  Anesthesiologist: JESSICA ESCOBAR  Preanesthetic Checklist  Completed: patient identified, site marked, surgical consent, pre-op evaluation, timeout performed, IV checked, risks and benefits discussed and monitors and equipment checked  Prep:  Sterile barriers:cap, gloves, mask and sterile barriers  Prep: ChloraPrep  Patient monitoring: blood pressure monitoring, continuous pulse oximetry and EKG  Procedure  Sedation:yes  Performed under: local infiltration  Guidance:ultrasound guided  ULTRASOUND INTERPRETATION. Using ultrasound guidance a 22 G and 21 G gauge needle was placed in close proximity to the nerve, at which point, under ultrasound guidance anesthetic was injected in the area of the nerve and spread of the anesthesia was seen on ultrasound in close proximity thereto.  There were no abnormalities seen on ultrasound; a digital image was taken; and the patient tolerated the procedure with no complications. Images:still images obtained    Block Type:popliteal  Injection Technique:single-shot  Needle Type:echogenic  Needle Gauge:21 G  Resistance on Injection: none  Medications  Local Injected:ropivacaine 0.5%  Post Assessment  Injection Assessment: negative aspiration for heme, no paresthesia on injection and incremental injection  Patient Tolerance:comfortable throughout block  Complications:no  Additional Notes  Ultrasound interpretation note:  Under ultrasound guidance, needle seen near nerves, local seen spreading around.  No abnormalities noted.  Block for postop pain per surgeon request.

## 2018-06-21 NOTE — PLAN OF CARE
Problem: Patient Care Overview  Goal: Plan of Care Review  Outcome: Ongoing (interventions implemented as appropriate)   06/21/18 1923   Coping/Psychosocial   Plan of Care Reviewed With patient;spouse   OTHER   Outcome Summary Pt arrived to floor 1855 from ORIF right tibia, A&O, voided upon arrival in urinal, VSS, KI in place

## 2018-06-21 NOTE — ANESTHESIA PREPROCEDURE EVALUATION
Anesthesia Evaluation     Patient summary reviewed   no history of anesthetic complications:  NPO Solid Status: > 6 hours  NPO Liquid Status: > 2 hours           Airway   Mallampati: II  TM distance: >3 FB  Neck ROM: full  No difficulty expected  Dental - normal exam     Pulmonary - normal exam   (-) not a smoker  Cardiovascular - normal exam    ECG reviewed    (-) angina      Neuro/Psych  GI/Hepatic/Renal/Endo      Musculoskeletal     Abdominal    Substance History      OB/GYN          Other                        Anesthesia Plan    ASA 1     general     Anesthetic plan and risks discussed with patient.

## 2018-06-22 VITALS
TEMPERATURE: 98.4 F | WEIGHT: 208.19 LBS | DIASTOLIC BLOOD PRESSURE: 75 MMHG | OXYGEN SATURATION: 97 % | HEART RATE: 65 BPM | HEIGHT: 69 IN | RESPIRATION RATE: 16 BRPM | SYSTOLIC BLOOD PRESSURE: 138 MMHG | BODY MASS INDEX: 30.83 KG/M2

## 2018-06-22 PROCEDURE — 25010000002 ENOXAPARIN PER 10 MG: Performed by: ORTHOPAEDIC SURGERY

## 2018-06-22 PROCEDURE — 25010000003 CEFAZOLIN IN DEXTROSE 2-4 GM/100ML-% SOLUTION: Performed by: ORTHOPAEDIC SURGERY

## 2018-06-22 PROCEDURE — 25010000002 KETOROLAC TROMETHAMINE PER 15 MG: Performed by: ORTHOPAEDIC SURGERY

## 2018-06-22 RX ORDER — PSEUDOEPHEDRINE HCL 30 MG
100 TABLET ORAL 2 TIMES DAILY PRN
Qty: 30 CAPSULE | Refills: 0 | Status: SHIPPED | OUTPATIENT
Start: 2018-06-22

## 2018-06-22 RX ORDER — HYDROCODONE BITARTRATE AND ACETAMINOPHEN 10; 325 MG/1; MG/1
1 TABLET ORAL EVERY 4 HOURS PRN
Qty: 60 TABLET | Refills: 0 | Status: SHIPPED | OUTPATIENT
Start: 2018-06-22 | End: 2018-07-01

## 2018-06-22 RX ADMIN — OXYCODONE HYDROCHLORIDE AND ACETAMINOPHEN 2 TABLET: 7.5; 325 TABLET ORAL at 07:53

## 2018-06-22 RX ADMIN — KETOROLAC TROMETHAMINE 30 MG: 30 INJECTION, SOLUTION INTRAMUSCULAR; INTRAVENOUS at 02:00

## 2018-06-22 RX ADMIN — KETOROLAC TROMETHAMINE 30 MG: 30 INJECTION, SOLUTION INTRAMUSCULAR; INTRAVENOUS at 09:14

## 2018-06-22 RX ADMIN — ENOXAPARIN SODIUM 40 MG: 100 INJECTION SUBCUTANEOUS at 09:14

## 2018-06-22 RX ADMIN — CEFAZOLIN SODIUM 2 G: 2 INJECTION, SOLUTION INTRAVENOUS at 05:52

## 2018-06-22 RX ADMIN — HYDROCODONE BITARTRATE AND ACETAMINOPHEN 1 TABLET: 10; 325 TABLET ORAL at 12:28

## 2018-06-22 RX ADMIN — OXYCODONE HYDROCHLORIDE AND ACETAMINOPHEN 2 TABLET: 7.5; 325 TABLET ORAL at 03:45

## 2018-06-22 NOTE — PLAN OF CARE
Problem: Patient Care Overview  Goal: Plan of Care Review  Outcome: Ongoing (interventions implemented as appropriate)   06/22/18 0319   Coping/Psychosocial   Plan of Care Reviewed With patient   OTHER   Outcome Summary pain controlled during shift with PO pain meds; voiding; vss; numbness still present due to block; NWB to RLE; knee immobilizer in place; plan to dc when ready   Plan of Care Review   Progress improving       Problem: Fall Risk (Adult)  Goal: Identify Related Risk Factors and Signs and Symptoms  Outcome: Outcome(s) achieved Date Met: 06/22/18    Goal: Absence of Fall  Outcome: Ongoing (interventions implemented as appropriate)      Problem: Pain, Acute (Adult)  Goal: Identify Related Risk Factors and Signs and Symptoms  Outcome: Outcome(s) achieved Date Met: 06/22/18    Goal: Acceptable Pain Control/Comfort Level  Outcome: Ongoing (interventions implemented as appropriate)      Problem: Self-Care Deficit (Adult,Obstetrics,Pediatric)  Goal: Identify Related Risk Factors and Signs and Symptoms  Outcome: Outcome(s) achieved Date Met: 06/22/18    Goal: Improved Ability to Perform BADL and IADL  Outcome: Ongoing (interventions implemented as appropriate)      Problem: Fracture Orthopaedic (Adult)  Goal: Signs and Symptoms of Listed Potential Problems Will be Absent, Minimized or Managed (Fracture Orthopaedic)  Outcome: Ongoing (interventions implemented as appropriate)

## 2018-06-22 NOTE — PAYOR COMM NOTE
"Clifford Khalil (48 y.o. Male)     Date of Birth Social Security Number Address Home Phone MRN    1970  [address] [place], [place] [address] [phone] [id number]    Oriental orthodox Marital Status          None        Admission Date Admission Type Admitting Provider Attending Provider Department, Room/Bed    6/21/18 Elective Carlos Smith MD  [address], P881/1    Discharge Date Discharge Disposition Discharge Destination        6/22/2018 Home-Health Care c              Attending Provider:  (none)   Allergies:  No Known Allergies    Isolation:  None   Infection:  None   Code Status:  CPR    Ht:  175.3 cm (69\")   Wt:  94.4 kg (208 lb 3 oz)    Admission Cmt:  None   Principal Problem:  Closed bicondylar fracture of right tibial plateau [S82.141A]                 Active Insurance as of 6/21/2018     Primary Coverage     Payor Plan Insurance Group Employer/Plan Group    ANTH BLUE CROSS ANTH BLUE CROSS BLUE SHIELD PPO [id number]     Payor Plan Address Payor Plan Phone Number Effective From Effective To    [address] [phone] 1/1/2014     [facility] [id number]       Subscriber Name Subscriber Birth Date Member ID       DAYANARA KHALIL 1/18/1971 [id number]                 Emergency Contacts      (Rel.) Home Phone Work Phone Mobile Phone    Dayanara Khalil (Spouse) -- -- [phone]          "

## 2018-06-22 NOTE — PROGRESS NOTES
Discharge Planning Assessment  [facility]     Patient Name: Clifford Cyr  MRN: [id number]  Today's Date: 6/22/2018    Admit Date: 6/21/2018          Discharge Needs Assessment     Row Name 06/22/18 1248       Living Environment    Lives With spouse    Current Living Arrangements home/apartment/condo    Family Caregiver if Needed spouse    Quality of Family Relationships helpful;involved;supportive    Able to Return to Prior Arrangements yes       Resource/Environmental Concerns    Resource/Environmental Concerns none       Transition Planning    Patient/Family Anticipates Transition to home with family    Transportation Anticipated family or friend will provide       Discharge Needs Assessment    Readmission Within the Last 30 Days no previous admission in last 30 days    Concerns to be Addressed no discharge needs identified;denies needs/concerns at this time    Equipment Currently Used at Home crutches    Offered/Gave Vendor List yes            Discharge Plan     Row Name 06/22/18 1249       Plan    Final Discharge Disposition Code 01 - home or self-care    Final Note Facesheet d/c plan verified, pt to d/c home w/ spouse. Pt/family deny d/c needs.         Destination     No service coordination in this encounter.      Durable Medical Equipment     No service coordination in this encounter.      Dialysis/Infusion     No service coordination in this encounter.      Home Medical Care     No service coordination in this encounter.      Social Care     No service coordination in this encounter.        Expected Discharge Date and Time     Expected Discharge Date Expected Discharge Time    Jun 22, 2018               Demographic Summary    No documentation.           Functional Status     Row Name 06/22/18 1249       Functional Status    Usual Activity Tolerance good    Current Activity Tolerance fair       Functional Status, IADL    Medications independent    Meal Preparation independent    Housekeeping independent     Laundry independent    Shopping independent       Mental Status    General Appearance WDL WDL       Mental Status Summary    Recent Changes in Mental Status/Cognitive Functioning no changes            Psychosocial    No documentation.           Abuse/Neglect    No documentation.           Legal    No documentation.           Substance Abuse    No documentation.           Patient Forms    No documentation.         Cece Bradley RN

## 2018-06-22 NOTE — DISCHARGE SUMMARY
DISCHARGE SUMMARY    Date of Discharge:      Discharge Diagnosis:  Closed bicondylar fracture of right tibial plateau [S82.141A]    DETAILS OF HOSPITAL STAY     Presenting Problem: Closed bicondylar fracture of right tibial plateau [S82.141A]    Hospital Course/History of Present Illness: Patient is a 48 y.o. male presented with Closed bicondylar fracture of right tibial plateau [S82.141A]. The patient did well postoperatively. At the time of discharge, the patient was afebrile, tolerating a regular diet, having normal bowel function, and adequate pain control on p.o. pain medication only.       Procedures Performed  Procedure(s):  Open reduction internal fixation right tibial plateau fracture       Consults:   Consults     No orders found for last 30 day(s).          Pertinent Test Results:     No visits with results within 2 Day(s) from this visit.   Latest known visit with results is:   Appointment on 06/20/2018   Component Date Value Ref Range Status   • Glucose 06/20/2018 135* 65 - 99 mg/dL Final   • BUN 06/20/2018 24* 6 - 20 mg/dL Final   • Creatinine 06/20/2018 1.01  0.76 - 1.27 mg/dL Final   • Sodium 06/20/2018 142  136 - 145 mmol/L Final   • Potassium 06/20/2018 4.4  3.5 - 5.2 mmol/L Final   • Chloride 06/20/2018 103  98 - 107 mmol/L Final   • CO2 06/20/2018 24.7  22.0 - 29.0 mmol/L Final   • Calcium 06/20/2018 9.2  8.6 - 10.5 mg/dL Final   • Total Protein 06/20/2018 7.3  6.0 - 8.5 g/dL Final   • Albumin 06/20/2018 4.40  3.50 - 5.20 g/dL Final   • ALT (SGPT) 06/20/2018 27  1 - 41 U/L Final   • AST (SGOT) 06/20/2018 17  1 - 40 U/L Final   • Alkaline Phosphatase 06/20/2018 53  39 - 117 U/L Final   • Total Bilirubin 06/20/2018 0.7  0.1 - 1.2 mg/dL Final   • eGFR Non  Amer 06/20/2018 79  >60 mL/min/1.73 Final   • Globulin 06/20/2018 2.9  gm/dL Final   • A/G Ratio 06/20/2018 1.5  g/dL Final   • BUN/Creatinine Ratio 06/20/2018 23.8  7.0 - 25.0 Final   • Anion Gap 06/20/2018 14.3  mmol/L Final   • WBC  "06/20/2018 8.06  4.50 - 10.70 10*3/mm3 Final   • RBC 06/20/2018 4.79  4.60 - 6.00 10*6/mm3 Final   • Hemoglobin 06/20/2018 14.2  13.7 - 17.6 g/dL Final   • Hematocrit 06/20/2018 43.4  40.4 - 52.2 % Final   • MCV 06/20/2018 90.6  79.8 - 96.2 fL Final   • MCH 06/20/2018 29.6  27.0 - 32.7 pg Final   • MCHC 06/20/2018 32.7  32.6 - 36.4 g/dL Final   • RDW 06/20/2018 12.6  11.5 - 14.5 % Final   • RDW-SD 06/20/2018 41.6  37.0 - 54.0 fl Final   • MPV 06/20/2018 10.9  6.0 - 12.0 fL Final   • Platelets 06/20/2018 254  140 - 500 10*3/mm3 Final   • Neutrophil % 06/20/2018 52.9  42.7 - 76.0 % Final   • Lymphocyte % 06/20/2018 30.0  19.6 - 45.3 % Final   • Monocyte % 06/20/2018 11.2  5.0 - 12.0 % Final   • Eosinophil % 06/20/2018 5.3  0.3 - 6.2 % Final   • Basophil % 06/20/2018 0.6  0.0 - 1.5 % Final   • Immature Grans % 06/20/2018 0.0  0.0 - 0.5 % Final   • Neutrophils, Absolute 06/20/2018 4.26  1.90 - 8.10 10*3/mm3 Final   • Lymphocytes, Absolute 06/20/2018 2.42  0.90 - 4.80 10*3/mm3 Final   • Monocytes, Absolute 06/20/2018 0.90  0.20 - 1.20 10*3/mm3 Final   • Eosinophils, Absolute 06/20/2018 0.43  0.00 - 0.70 10*3/mm3 Final   • Basophils, Absolute 06/20/2018 0.05  0.00 - 0.20 10*3/mm3 Final   • Immature Grans, Absolute 06/20/2018 0.00  0.00 - 0.03 10*3/mm3 Final   ; No results found.    Condition on Discharge:  Stable. The patient is awake, alert and oriented.  The incision is healthy.  No clinical signs of DVT.      Vital Signs  Temp:  [97.1 °F (36.2 °C)-98.5 °F (36.9 °C)] 98.1 °F (36.7 °C)  Heart Rate:  [56-92] 83  Resp:  [13-20] 16  BP: (127-164)/() 127/71    Flowsheet Rows      First Filed Value   Admission Height  175.3 cm (69\") Documented at 06/21/2018 1037   Admission Weight  94.4 kg (208 lb 3 oz) Documented at 06/21/2018 1037          Discharge Disposition  Home-Health Care Svc    Discharge Medications     Discharge Medications      New Medications      Instructions Start Date   docusate sodium 100 MG capsule   " 100 mg, Oral, 2 Times Daily PRN      enoxaparin 40 MG/0.4ML solution syringe  Commonly known as:  LOVENOX   40 mg, Subcutaneous, Daily   Start Date:  6/23/2018     HYDROcodone-acetaminophen  MG per tablet  Commonly known as:  NORCO   1 tablet, Oral, Every 4 Hours PRN         Continue These Medications      Instructions Start Date   ibuprofen 200 MG tablet  Commonly known as:  ADVIL,MOTRIN   400 mg, Oral, Every 6 Hours PRN         Stop These Medications    oxyCODONE-acetaminophen 7.5-325 MG per tablet  Commonly known as:  PERCOCET            Discharge Diet:  Regular    Activity at Discharge: Nonweight bearing right leg    Follow-up Appointments  No future appointments.  Additional Instructions for the Follow-ups that You Need to Schedule     Ambulatory Referral to Home Health    As directed      Face to Face Visit Date:  6/22/2018    Follow-up Provider for Plan of Care?:  I will be treating the patient on an ongoing basis.  Please send me the Plan of Care for signature.    Follow-up Provider:  CARLOS ARAIZA [[id number]]    Reason/Clinical Findings:  ORIF right tibial plateau    Describe mobility limitations that make leaving home difficult:  pain and immobility    Nursing/Therapeutic Services Requested:  Skilled Nursing Physical Therapy Occupational Therapy    Skilled nursing orders:  Wound care dressing/changes    PT orders:  Gait Training (OK for static quad sets, OK for straight leg raise with knee immobilizer  when able to tolerate, OK to start active range of motion when able to tolerate after June 30 )    Weight Bearing Status:  Non-Weight Bearing    Occupational orders:  Activities of daily living Energy conservation    Frequency:  1 Week 1                Carlos Araiza MD  06/22/18  9:31 AM    CC: Maximus Perez DO; Carlos Araiza MD   Consults     No orders found for last 30 day(s).       Carlos Araiza, *

## 2018-06-22 NOTE — PROGRESS NOTES
Orthopedic Progress Note      Patient: Clifford Cyr  YOB: 1970     Date of Admission: 6/21/2018 10:09 AM Medical Record Number: [id number]     Attending Physician: Carlos Smith, *    Status Post:  RT TIBIAL PLATEAU OPEN REDUCTION INTERNAL FIXATION Post Operative Day Number: 1    Subjective : No new orthopaedic complaints     Pain Relief: some relief with present medication.     Systemic Complaints: No Complaints  Vitals:    06/21/18 2100 06/21/18 2355 06/22/18 0347 06/22/18 0756   BP: 137/84 143/74 135/60 127/71   BP Location: Right arm Left arm Left arm Right arm   Patient Position: Lying Lying Lying Lying   Pulse: 74 81 86 83   Resp: 16 16 16 16   Temp: 98 °F (36.7 °C) 98 °F (36.7 °C) 98.3 °F (36.8 °C) 98.1 °F (36.7 °C)   TempSrc: Oral Oral Oral Oral   SpO2: 95% 96% 97% 93%   Weight:       Height:           Physical Exam: 48 y.o. male    General Appearance:       Alert, cooperative, in no acute distress                  Extremities:    Dressing Clean, Dry and Intact         Incision healthy without signs or symptoms of infections         No clinical sign of DVT        Able to do good movements of digits    Pulses:   Pulses palpable and equal bilaterally           Diagnostic Tests:       Results from last 7 days  Lab Units 06/20/18  1034   WBC 10*3/mm3 8.06   HEMOGLOBIN g/dL 14.2   HEMATOCRIT % 43.4   PLATELETS 10*3/mm3 254       Results from last 7 days  Lab Units 06/20/18  1034   SODIUM mmol/L 142   POTASSIUM mmol/L 4.4   CHLORIDE mmol/L 103   CO2 mmol/L 24.7   BUN mg/dL 24*   CREATININE mg/dL 1.01   GLUCOSE mg/dL 135*   CALCIUM mg/dL 9.2         No results found for: URICACID  No results found for: CRYSTAL  Microbiology Results (last 10 days)     ** No results found for the last 240 hours. **        Xr Knee 1 Or 2 View Right    Result Date: 6/21/2018   Postsurgical changes.    This report was finalized on 6/21/2018 5:57 PM by Dr. Darwin Reid M.D.      Xr Knee 3 View  Right    Result Date: 6/18/2018  Ordering physician's impression is located in the Encounter Note dated 06/18/18. X-ray performed in the DR room.     Xr Tibia Fibula 2 View Right    Result Date: 6/21/2018   As described.  This report was finalized on 6/21/2018 4:28 PM by Dr. Darwin Reid M.D.      Fl C Arm During Surgery    Result Date: 6/21/2018   As described.  This report was finalized on 6/21/2018 4:28 PM by Dr. Darwin Reid M.D.          Current Medications:  Scheduled Meds:  enoxaparin 40 mg Subcutaneous Daily     Continuous Infusions:  lactated ringers 100 mL/hr Last Rate: Stopped (06/22/18 0017)     PRN Meds:.•  bisacodyl  •  docusate sodium  •  HYDROcodone-acetaminophen  •  HYDROmorphone **AND** naloxone  •  ondansetron **OR** ondansetron ODT **OR** ondansetron  •  oxyCODONE-acetaminophen    Assessment: Status post  RT TIBIAL PLATEAU OPEN REDUCTION INTERNAL FIXATION    Patient Active Problem List   Diagnosis   • Closed bicondylar fracture of right tibial plateau       PLAN:   Continues current post-op course  Anticoagulation: Lovenox started  Dressing Change prn  Mobilize with PT as tolerated per protocol    Weight Bearing: NWB  Discharge Plan: OK to plan for discharge in  today to home and home health  from orthopadic perspective.    Carlos Smith MD    Date: 6/22/2018    Time: 9:24 AM

## 2018-06-22 NOTE — SIGNIFICANT NOTE
06/22/18 1005   Rehab Time/Intention   Evaluation Not Performed other (see comments)  (Spoke with pt. in room. Pt. already up independently in his room and has his own crutches for home use.  Pt. has no questions/concerns regarding functional mobility or wt. bearing status.  No skilled P.T. needed. Will sign off.)   Rehab Treatment   Discipline physical therapist

## 2021-03-30 ENCOUNTER — BULK ORDERING (OUTPATIENT)
Dept: CASE MANAGEMENT | Facility: OTHER | Age: 51
End: 2021-03-30

## 2021-03-30 DIAGNOSIS — Z23 IMMUNIZATION DUE: ICD-10-CM

## 2023-04-24 DIAGNOSIS — Z82.49 FAMILY HISTORY OF CHF (CONGESTIVE HEART FAILURE): Primary | ICD-10-CM

## (undated) DEVICE — PK ORTHO MAJ 40

## (undated) DEVICE — SPNG GZ WOVN 4X4IN 12PLY 10/BX STRL

## (undated) DEVICE — DRSNG BRDR MEPILEX P/OP SIL 4X12IN

## (undated) DEVICE — DRSNG GZ CURAD XEROFORM NONADHS 5X9IN STRL

## (undated) DEVICE — DRSNG PAD ABD 8X10IN STRL

## (undated) DEVICE — IMMOB KN 3PNL DLX CANVS 22IN BLU

## (undated) DEVICE — T-DRAPE,EXTREMITY,STERILE: Brand: MEDLINE

## (undated) DEVICE — GLV SURG TRIUMPH CLASSIC PF LTX 8 STRL

## (undated) DEVICE — 3M™ STERI-STRIP™ REINFORCED ADHESIVE SKIN CLOSURES, R1547, 1/2 IN X 4 IN (12 MM X 100 MM), 6 STRIPS/ENVELOPE: Brand: 3M™ STERI-STRIP™

## (undated) DEVICE — BNDG ESMARK STRL 6INX12FT LF

## (undated) DEVICE — GOWN,PREVENTION PLUS,XLONG/XLARGE,STRL: Brand: MEDLINE

## (undated) DEVICE — DRP C/ARMOR

## (undated) DEVICE — ENCORE® LATEX ORTHO SIZE 8, STERILE LATEX POWDER-FREE SURGICAL GLOVE: Brand: ENCORE

## (undated) DEVICE — BNDG ELAS ELITE V/CLOSE 6IN 5YD LF STRL

## (undated) DEVICE — Device

## (undated) DEVICE — UNDERCAST PADDING: Brand: DEROYAL

## (undated) DEVICE — SOL ISO/ALC RUB 70PCT 4OZ

## (undated) DEVICE — SUT VIC 3/0 CTI 36IN J944H

## (undated) DEVICE — APPL CHLORAPREP W/TINT 26ML ORNG

## (undated) DEVICE — DRP C/ARM 41X74IN

## (undated) DEVICE — SUT ETHIB 1 CT1 30IN  X425H

## (undated) DEVICE — BIT DRL QC DIA 2.5X110MM

## (undated) DEVICE — SKIN PREP TRAY W/CHG: Brand: MEDLINE INDUSTRIES, INC.

## (undated) DEVICE — DRAPE,REIN 53X77,STERILE: Brand: MEDLINE

## (undated) DEVICE — 3M™ IOBAN™ 2 ANTIMICROBIAL INCISE DRAPE 6650EZ: Brand: IOBAN™ 2

## (undated) DEVICE — GLV SURG BIOGEL LTX PF 8

## (undated) DEVICE — DISPOSABLE TOURNIQUET CUFF SINGLE BLADDER, SINGLE PORT AND QUICK CONNECT CONNECTOR: Brand: COLOR CUFF

## (undated) DEVICE — OCCLUSIVE GAUZE STRIP,3% BISMUTH TRIBROMOPHENATE IN PETROLATUM BLEND: Brand: XEROFORM

## (undated) DEVICE — SPNG LAP 18X18IN LF STRL PK/5

## (undated) DEVICE — PROXIMATE RH ROTATING HEAD SKIN STAPLERS (35 WIDE) CONTAINS 35 STAINLESS STEEL STAPLES: Brand: PROXIMATE

## (undated) DEVICE — SUT VIC 0 CT1 36IN J946H

## (undated) DEVICE — PAD,ABDOMINAL,8"X10",ST,LF: Brand: MEDLINE

## (undated) DEVICE — KWIRE THRD TROC/TP 1.6X5X150MM NS
Type: IMPLANTABLE DEVICE | Site: TIBIA | Status: NON-FUNCTIONAL
Removed: 2018-06-21

## (undated) DEVICE — DRSNG WND GEL FIBR OPTICELL AG PLS W/SLV LF 4X5IN  STRL